# Patient Record
Sex: FEMALE | URBAN - METROPOLITAN AREA
[De-identification: names, ages, dates, MRNs, and addresses within clinical notes are randomized per-mention and may not be internally consistent; named-entity substitution may affect disease eponyms.]

---

## 2021-02-03 ENCOUNTER — TELEPHONE ENCOUNTER (OUTPATIENT)
Dept: URBAN - METROPOLITAN AREA SURGERY CENTER 16 | Facility: SURGERY CENTER | Age: 66
End: 2021-02-03

## 2021-02-03 RX ORDER — BISACODYL 5 MG
1 -3 TABLETS EACH NIGHT FOR 3 NIGHTS BEFORE STARTING PREP TABLET, DELAYED RELEASE (ENTERIC COATED) ORAL ONCE A DAY
Qty: 9 TABLET | Refills: 0 | OUTPATIENT
Start: 2021-02-08 | End: 2021-02-11

## 2021-02-03 RX ORDER — ONDANSETRON HYDROCHLORIDE 4 MG/1
1 TABLET 15 MIN BEFORE EACH DOSE OF COLON PREP TABLET, FILM COATED ORAL TWICE A DAY
Qty: 2 TABLET | Refills: 0 | OUTPATIENT
Start: 2021-02-08

## 2021-02-03 RX ORDER — SODIUM, POTASSIUM,MAG SULFATES 17.5-3.13G
TAKE 177 ML EVENING BEFORE AND 177 ML 5 HOURS BEFORE TEST SOLUTION, RECONSTITUTED, ORAL ORAL
Qty: 1 KIT | Refills: 0 | OUTPATIENT
Start: 2021-02-08 | End: 2021-02-09

## 2021-03-25 ENCOUNTER — OFFICE VISIT (OUTPATIENT)
Dept: URBAN - METROPOLITAN AREA SURGERY CENTER 13 | Facility: SURGERY CENTER | Age: 66
End: 2021-03-25

## 2021-04-05 PROBLEM — 428283002 HISTORY OF POLYP OF COLON: Status: ACTIVE | Noted: 2021-04-05

## 2021-04-06 ENCOUNTER — WEB ENCOUNTER (OUTPATIENT)
Dept: URBAN - METROPOLITAN AREA CLINIC 92 | Facility: CLINIC | Age: 66
End: 2021-04-06

## 2021-04-08 ENCOUNTER — OFFICE VISIT (OUTPATIENT)
Dept: URBAN - METROPOLITAN AREA SURGERY CENTER 13 | Facility: SURGERY CENTER | Age: 66
End: 2021-04-08
Payer: COMMERCIAL

## 2021-04-08 DIAGNOSIS — Z86.010 H/O ADENOMATOUS POLYP OF COLON: ICD-10-CM

## 2021-04-08 DIAGNOSIS — D12.4 ADENOMA OF DESCENDING COLON: ICD-10-CM

## 2021-04-08 DIAGNOSIS — D12.8 ADENOMATOUS POLYP OF RECTUM: ICD-10-CM

## 2021-04-08 PROCEDURE — 45380 COLONOSCOPY AND BIOPSY: CPT | Performed by: INTERNAL MEDICINE

## 2021-04-08 PROCEDURE — 45381 COLONOSCOPY SUBMUCOUS NJX: CPT | Performed by: INTERNAL MEDICINE

## 2021-04-08 PROCEDURE — G8907 PT DOC NO EVENTS ON DISCHARG: HCPCS | Performed by: INTERNAL MEDICINE

## 2021-04-08 PROCEDURE — 45385 COLONOSCOPY W/LESION REMOVAL: CPT | Performed by: INTERNAL MEDICINE

## 2021-04-08 RX ORDER — ONDANSETRON HYDROCHLORIDE 4 MG/1
1 TABLET 15 MIN BEFORE EACH DOSE OF COLON PREP TABLET, FILM COATED ORAL TWICE A DAY
Qty: 2 TABLET | Refills: 0 | Status: ACTIVE | COMMUNITY
Start: 2021-02-08

## 2021-05-25 ENCOUNTER — OFFICE VISIT (OUTPATIENT)
Dept: URBAN - METROPOLITAN AREA CLINIC 115 | Facility: CLINIC | Age: 66
End: 2021-05-25
Payer: COMMERCIAL

## 2021-05-25 ENCOUNTER — LAB OUTSIDE AN ENCOUNTER (OUTPATIENT)
Dept: URBAN - METROPOLITAN AREA CLINIC 115 | Facility: CLINIC | Age: 66
End: 2021-05-25

## 2021-05-25 DIAGNOSIS — N84.0 UTERINE POLYP: ICD-10-CM

## 2021-05-25 DIAGNOSIS — Z86.010 PERSONAL HISTORY OF COLONIC POLYPS: ICD-10-CM

## 2021-05-25 DIAGNOSIS — K21.9 GASTROESOPHAGEAL REFLUX DISEASE, UNSPECIFIED WHETHER ESOPHAGITIS PRESENT: ICD-10-CM

## 2021-05-25 DIAGNOSIS — E66.3 OVERWEIGHT: ICD-10-CM

## 2021-05-25 PROCEDURE — 99214 OFFICE O/P EST MOD 30 MIN: CPT | Performed by: INTERNAL MEDICINE

## 2021-05-25 RX ORDER — ONDANSETRON HYDROCHLORIDE 4 MG/1
1 TABLET 15 MIN BEFORE EACH DOSE OF COLON PREP TABLET, FILM COATED ORAL TWICE A DAY
Qty: 2 TABLET | Refills: 0
Start: 2021-02-08

## 2021-05-25 RX ORDER — SODIUM, POTASSIUM,MAG SULFATES 17.5-3.13G
AS DIRECTED HALF EVENING BEFORE AND HALF 6 HOURS BEFORE TEST SOLUTION, RECONSTITUTED, ORAL ORAL AS DIRECTED
Qty: 1 | Refills: 0 | OUTPATIENT
Start: 2021-05-25 | End: 2021-05-26

## 2021-05-25 RX ORDER — BISACODYL 5 MG
1 -3 TABLETS EACH NIGHT FOR 3 NIGHTS BEFORE STARTING PREP TABLET, DELAYED RELEASE (ENTERIC COATED) ORAL ONCE A DAY
Qty: 9 TABLET | Refills: 0 | OUTPATIENT
Start: 2021-05-25 | End: 2021-05-28

## 2021-05-25 RX ORDER — ONDANSETRON HYDROCHLORIDE 4 MG/1
1 TABLET 15 MIN BEFORE EACH DOSE OF COLON PREP TABLET, FILM COATED ORAL TWICE A DAY
Qty: 2 TABLET | Refills: 0 | Status: ON HOLD | COMMUNITY
Start: 2021-02-08

## 2021-05-25 RX ORDER — CYCLOBENZAPRINE HYDROCHLORIDE 10 MG/1
1 TABLET AT BEDTIME AS NEEDED TABLET, FILM COATED ORAL ONCE A DAY
Status: ACTIVE | COMMUNITY

## 2021-05-25 NOTE — PHYSICAL EXAM HENT:
Head, normocephalic, atraumatic, Face, Face within normal limits, Ears, External ears within normal limits, Nose/Nasopharynx, External nose normal appearance, nares patent, no nasal discharge, Patient is wearing a mask due to COVID-19.

## 2021-05-25 NOTE — HPI-TODAY'S VISIT:
Patient is a 65 year old female who is present for a gastroenterologist visit for evaluation of   5/25/21  Discussed that her last colonoscopy in 4/8/21 showed a TVA, TA and HP.  Patient reports she may have to redo her insurance soon and she has had polyps uterine polyps removed. Patient has a family history of breast and prostate cancer.   Time spent with patient: 19 min.

## 2021-08-26 ENCOUNTER — OFFICE VISIT (OUTPATIENT)
Dept: URBAN - METROPOLITAN AREA SURGERY CENTER 13 | Facility: SURGERY CENTER | Age: 66
End: 2021-08-26

## 2021-10-21 ENCOUNTER — OFFICE VISIT (OUTPATIENT)
Dept: URBAN - METROPOLITAN AREA SURGERY CENTER 13 | Facility: SURGERY CENTER | Age: 66
End: 2021-10-21

## 2021-11-18 ENCOUNTER — OFFICE VISIT (OUTPATIENT)
Dept: URBAN - METROPOLITAN AREA SURGERY CENTER 13 | Facility: SURGERY CENTER | Age: 66
End: 2021-11-18
Payer: COMMERCIAL

## 2021-11-18 DIAGNOSIS — Z86.010 ADENOMAS PERSONAL HISTORY OF COLONIC POLYPS: ICD-10-CM

## 2021-11-18 DIAGNOSIS — D12.2 ADENOMA OF ASCENDING COLON: ICD-10-CM

## 2021-11-18 DIAGNOSIS — K63.89 BACTERIAL OVERGROWTH SYNDROME: ICD-10-CM

## 2021-11-18 PROCEDURE — 45385 COLONOSCOPY W/LESION REMOVAL: CPT | Performed by: INTERNAL MEDICINE

## 2021-11-18 PROCEDURE — G8907 PT DOC NO EVENTS ON DISCHARG: HCPCS | Performed by: INTERNAL MEDICINE

## 2021-11-18 RX ORDER — CYCLOBENZAPRINE HYDROCHLORIDE 10 MG/1
1 TABLET AT BEDTIME AS NEEDED TABLET, FILM COATED ORAL ONCE A DAY
Status: ACTIVE | COMMUNITY

## 2021-11-18 RX ORDER — ONDANSETRON HYDROCHLORIDE 4 MG/1
1 TABLET 15 MIN BEFORE EACH DOSE OF COLON PREP TABLET, FILM COATED ORAL TWICE A DAY
Qty: 2 TABLET | Refills: 0 | Status: ACTIVE | COMMUNITY
Start: 2021-02-08

## 2021-12-14 ENCOUNTER — DASHBOARD ENCOUNTERS (OUTPATIENT)
Age: 66
End: 2021-12-14

## 2021-12-14 ENCOUNTER — OFFICE VISIT (OUTPATIENT)
Dept: URBAN - METROPOLITAN AREA CLINIC 115 | Facility: CLINIC | Age: 66
End: 2021-12-14
Payer: COMMERCIAL

## 2021-12-14 DIAGNOSIS — Z80.3 FAMILY HISTORY OF BREAST CANCER: ICD-10-CM

## 2021-12-14 DIAGNOSIS — Z78.9 WEIGHT LOSS ADVISED: ICD-10-CM

## 2021-12-14 DIAGNOSIS — Z86.010 PERSONAL HISTORY OF COLONIC POLYPS: ICD-10-CM

## 2021-12-14 DIAGNOSIS — Z84.81 FHX: BRCA GENE POSITIVE: ICD-10-CM

## 2021-12-14 DIAGNOSIS — N84.0 UTERINE POLYP: ICD-10-CM

## 2021-12-14 PROBLEM — 428283002: Status: ACTIVE | Noted: 2021-05-25

## 2021-12-14 PROCEDURE — G8417 CALC BMI ABV UP PARAM F/U: HCPCS | Performed by: INTERNAL MEDICINE

## 2021-12-14 PROCEDURE — 99213 OFFICE O/P EST LOW 20 MIN: CPT | Performed by: INTERNAL MEDICINE

## 2021-12-14 PROCEDURE — G9903 PT SCRN TBCO ID AS NON USER: HCPCS | Performed by: INTERNAL MEDICINE

## 2021-12-14 PROCEDURE — G8427 DOCREV CUR MEDS BY ELIG CLIN: HCPCS | Performed by: INTERNAL MEDICINE

## 2021-12-14 RX ORDER — ONDANSETRON HYDROCHLORIDE 4 MG/1
1 TABLET 15 MIN BEFORE EACH DOSE OF COLON PREP TABLET, FILM COATED ORAL TWICE A DAY
Qty: 2 TABLET | Refills: 0 | Status: ON HOLD | COMMUNITY
Start: 2021-02-08

## 2021-12-14 RX ORDER — CYCLOBENZAPRINE HYDROCHLORIDE 10 MG/1
1 TABLET AT BEDTIME AS NEEDED TABLET, FILM COATED ORAL ONCE A DAY
Status: ACTIVE | COMMUNITY

## 2021-12-14 NOTE — PHYSICAL EXAM HENT:
Head,  normocephalic,  atraumatic,  Face,  Face within normal limits,  Mouth and Throat,  Oral cavity appearance normal,  Breath odor normal,  Lips,  Appearance normal

## 2021-12-14 NOTE — HPI-TODAY'S VISIT:
Patient is a 65 year old female who is present for a gastroenterologist visit for evaluation of   5/25/21  Discussed that her last colonoscopy in 4/8/21 showed a TVA, TA and HP.  Patient reports she may have to redo her insurance soon and she has had polyps uterine polyps removed. Patient has a family history of breast and prostate cancer.   Time spent with patient: 19 min. 12/14/21 Patient is a 66 year old female present for office visit for follow-up after colonoscopy procedure. Colonoscopy completed 11/18/21 showed one 10 mm polyp proximal to anus, one 10 m polyp in mid AC, one 13 mm polyp in SC, seven 7-11 mm polyps in SC, polyposis at the recto SC region. Repeat 1 year. Patient has a personal history of colon polyps. She has a family history of breast cancer. She had [facility] genetic testing completed in 2012, and it was positive. She had a non-cancerous uterine polyp removed last year. Patient admits to high candy and sugar intake. She has cut bread and pork out of her diet. Admits to low physcial activity. She takes diclofenac for inflammation.

## 2022-09-19 ENCOUNTER — TELEPHONE ENCOUNTER (OUTPATIENT)
Dept: URBAN - METROPOLITAN AREA CLINIC 82 | Facility: CLINIC | Age: 67
End: 2022-09-19

## 2022-12-14 ENCOUNTER — LAB OUTSIDE AN ENCOUNTER (OUTPATIENT)
Dept: URBAN - METROPOLITAN AREA CLINIC 115 | Facility: CLINIC | Age: 67
End: 2022-12-14